# Patient Record
(demographics unavailable — no encounter records)

---

## 2025-07-15 NOTE — PHYSICAL EXAM
[General Appearance - Alert] : alert [General Appearance - In No Acute Distress] : in no acute distress [Oriented To Time, Place, And Person] : oriented to person, place, and time [Impaired Insight] : insight and judgment were intact [Affect] : the affect was normal [Person] : oriented to person [Place] : oriented to place [Time] : oriented to time [Short Term Intact] : short term memory intact [Remote Intact] : remote memory intact [Span Intact] : the attention span was normal [Concentration Intact] : normal concentrating ability [Fluency] : fluency intact [Comprehension] : comprehension intact [Current Events] : adequate knowledge of current events [Past History] : adequate knowledge of personal past history [Vocabulary] : adequate range of vocabulary [Cranial Nerves Optic (II)] : visual acuity intact bilaterally,  pupils equal round and reactive to light [Cranial Nerves Oculomotor (III)] : extraocular motion intact [Cranial Nerves Trigeminal (V)] : facial sensation intact symmetrically [Cranial Nerves Facial (VII)] : face symmetrical [Cranial Nerves Vestibulocochlear (VIII)] : hearing was intact bilaterally [Cranial Nerves Accessory (XI - Cranial And Spinal)] : head turning and shoulder shrug symmetric [Cranial Nerves Hypoglossal (XII)] : there was no tongue deviation with protrusion [Motor Tone] : muscle tone was normal in all four extremities [Motor Strength] : muscle strength was normal in all four extremities [Sensation Tactile Decrease] : light touch was intact [Abnormal Walk] : normal gait [Balance] : balance was intact [Past-pointing] : there was no past-pointing [Tremor] : no tremor present

## 2025-07-15 NOTE — ASSESSMENT
[FreeTextEntry1] : I, Dr. Ramos, personally performed the evaluation and management (E/M) services for this new patient who presents today with  (an) existing condition(s). That E/M includes conducting the examination, assessing all new/exacerbated conditions, and establishing a new plan of care. Today, my ACP, Bhumi Stapleton, was here to observe my evaluation and management services for this new problem/exacerbated condition to be followed going forward.  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 - no treatment recommended at this time - MRA NOVA test and RTC to discuss - referred to Lilliam Barker

## 2025-07-15 NOTE — HISTORY OF PRESENT ILLNESS
[FreeTextEntry1] : neurovascular stenosis [de-identified] : The patient is a 74 year old female who was initially referred by Dr. Corrales for venous insufficiency to Dr. Josiah Kent. Her initial symptoms were extreme fatigue, dizziness, vertigo, and general difficulties with ADLS. 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 Also noted was severe stenosis of bilat subclavian veins s/p angioplasty with improvement in flow and diameter  She then followed by with Dr. Corrales on 4/20/25 who reported that her orthostatic hypotension had improved. HAd + tilt test study. Has been on/off midodrine, now off  6/16/25 seen by Dr. Kent. Pt noted large swings in Bp causing her to feel dizzy. denied double vision or tinnitus. Had weight gain. referred: fibromuscular dysplasia left stent, needs to be done on the right. passed out after surgery

## 2025-07-15 NOTE — ADDENDUM
[FreeTextEntry1] : Patient: Kanchan Jolly Summary: The patient presents with a history of vascular irregularities, specifically fibromuscular dysplasia, which was visualized on imaging studies of the brain and neck vessels. The patient is currently being treated with aspirin and has a history of stenting. Chief Complaint (CC): The patient's visit is for follow-up and management of fibromuscular dysplasia. History of Present Illness (HPI): The patient's condition was diagnosed after imaging studies revealed irregularities in the vessels of the neck, characterized by a "humped up" appearance, while the intracranial vessels appeared smooth and normal. The patient is at a higher risk of stroke due to this condition. The patient has been treated with aspirin, which is intended to prevent stroke by thinning the blood. The patient has also undergone stenting, and the duration of antiplatelet therapy is to be determined by the consulting physician, Lior. Past Medical History: The patient has a history of vascular disease, including fibromuscular dysplasia, and has undergone stenting. Past Surgical History: The patient has a history of stenting, but the date and potential complications are not specified. Family History: No relevant family history is mentioned. Social History: No detailed social history is provided, but the patient is able to understand and follow treatment instructions. Review of Systems: No systemic review is provided, but the patient's vascular system is the primary focus of the visit. Medications: The patient is currently taking aspirin and possibly Plavix, although the latter is to be reviewed by the consulting physician. Allergies: No allergies are mentioned.  OBJECTIVE: Diagnostic Results: Imaging studies of the brain and neck vessels reveal fibromuscular dysplasia, characterized by irregular, "humped up" vessels in the neck, with normal intracranial vessels. Vital Signs: No vital signs are reported. Physical Examination: No physical examination findings are provided, as the diagnosis is based on imaging studies.  ASSESSMENT: Summary: The patient has fibromuscular dysplasia, which increases the risk of stroke, and is being treated with aspirin. The patient has undergone stenting and will require ongoing management and follow-up. Problems:  1. Fibromuscular dysplasia 2. Increased risk of stroke Differential Diagnosis: No alternative diagnoses are considered, as the imaging studies are diagnostic of fibromuscular dysplasia.  PLAN: Summary: The patient will continue on aspirin therapy to prevent stroke, and the consulting physician, Lior, will determine the duration of antiplatelet therapy. The patient will also be seen by Dr. Barker for further management and guidance. Plan:  1. Continue aspirin therapy to prevent stroke. 2. Consult with Lior to determine the duration of antiplatelet therapy. 3. Follow up with Dr. Barker for ongoing management and guidance. 4. Monitor for signs and symptoms of stroke, and adjust the treatment plan as necessary.

## 2025-07-15 NOTE — HISTORY OF PRESENT ILLNESS
[FreeTextEntry1] : neurovascular stenosis [de-identified] : The patient is a 74 year old female who was initially referred by Dr. Corrales for venous insufficiency to Dr. Josiah Kent. Her initial symptoms were extreme fatigue, dizziness, vertigo, and general difficulties with ADLS. 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 Also noted was severe stenosis of bilat subclavian veins s/p angioplasty with improvement in flow and diameter  She then followed by with Dr. Corrales on 4/20/25 who reported that her orthostatic hypotension had improved. HAd + tilt test study. Has been on/off midodrine, now off  6/16/25 seen by Dr. Kent. Pt noted large swings in Bp causing her to feel dizzy. denied double vision or tinnitus. Had weight gain. referred: fibromuscular dysplasia left stent, needs to be done on the right. passed out after surgery

## 2025-07-15 NOTE — HISTORY OF PRESENT ILLNESS
[FreeTextEntry1] : neurovascular stenosis [de-identified] : The patient is a 74 year old female who was initially referred by Dr. Corrales for venous insufficiency to Dr. Josiah Kent. Her initial symptoms were extreme fatigue, dizziness, vertigo, and general difficulties with ADLS. 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 Also noted was severe stenosis of bilat subclavian veins s/p angioplasty with improvement in flow and diameter  She then followed by with Dr. Corrales on 4/20/25 who reported that her orthostatic hypotension had improved. HAd + tilt test study. Has been on/off midodrine, now off  6/16/25 seen by Dr. Kent. Pt noted large swings in Bp causing her to feel dizzy. denied double vision or tinnitus. Had weight gain. referred: fibromuscular dysplasia left stent, needs to be done on the right. passed out after surgery